# Patient Record
Sex: MALE | Race: WHITE | Employment: STUDENT | URBAN - METROPOLITAN AREA
[De-identification: names, ages, dates, MRNs, and addresses within clinical notes are randomized per-mention and may not be internally consistent; named-entity substitution may affect disease eponyms.]

---

## 2017-10-11 ENCOUNTER — HOSPITAL ENCOUNTER (EMERGENCY)
Age: 22
Discharge: HOME | End: 2017-10-11
Attending: EMERGENCY MEDICINE | Admitting: EMERGENCY MEDICINE
Payer: COMMERCIAL

## 2017-10-11 VITALS
RESPIRATION RATE: 14 BRPM | WEIGHT: 160 LBS | DIASTOLIC BLOOD PRESSURE: 79 MMHG | HEART RATE: 74 BPM | OXYGEN SATURATION: 97 % | BODY MASS INDEX: 22.9 KG/M2 | TEMPERATURE: 98.4 F | SYSTOLIC BLOOD PRESSURE: 132 MMHG | HEIGHT: 70 IN

## 2017-10-11 DIAGNOSIS — S61.211A LACERATION OF LEFT INDEX FINGER WITHOUT FOREIGN BODY WITHOUT DAMAGE TO NAIL, INITIAL ENCOUNTER: Primary | ICD-10-CM

## 2017-10-11 PROCEDURE — 99282 EMERGENCY DEPT VISIT SF MDM: CPT

## 2017-10-11 PROCEDURE — 77030002986 HC SUT PROL J&J -A

## 2017-10-11 PROCEDURE — 74011000250 HC RX REV CODE- 250: Performed by: NURSE PRACTITIONER

## 2017-10-11 PROCEDURE — 75810000293 HC SIMP/SUPERF WND  RPR

## 2017-10-11 PROCEDURE — 77030018836 HC SOL IRR NACL ICUM -A

## 2017-10-11 RX ORDER — BACITRACIN ZINC 500 [USP'U]/G
1 CREAM TOPICAL ONCE
Status: COMPLETED | OUTPATIENT
Start: 2017-10-11 | End: 2017-10-11

## 2017-10-11 RX ORDER — LIDOCAINE HYDROCHLORIDE 10 MG/ML
10 INJECTION INFILTRATION; PERINEURAL ONCE
Status: COMPLETED | OUTPATIENT
Start: 2017-10-11 | End: 2017-10-11

## 2017-10-11 RX ADMIN — BACITRACIN ZINC 1 PACKET: 500 OINTMENT TOPICAL at 13:06

## 2017-10-11 RX ADMIN — LIDOCAINE HYDROCHLORIDE 10 ML: 10 INJECTION, SOLUTION INFILTRATION; PERINEURAL at 13:06

## 2017-10-11 NOTE — ED PROVIDER NOTES
HPI Comments: Patient is a 64-year-old male who comes to the ED today via private vehicle with a laceration to his left index finger. Patient states it occurred approximately 10 minutes prior to arrival. Patient states he was opening a box with a copy of it when the knife slipped and cut the inside surface of his index finger. Patient denies any medications daily or allergies. Tetanus was last updated in 2013. In no further complaints at this time. Primary care provider:    Patient is a 23 y.o. male presenting with skin laceration. The history is provided by the patient. Laceration    The incident occurred less than 1 hour ago. The laceration is located on the left hand. The laceration is 2 cm in size. The injury mechanism is a dirty knife. Foreign body present: no. The pain is at a severity of 2/10. The pain is mild. The pain has been constant since onset. Associated symptoms include numbness. Pertinent negatives include no tingling, no weakness, no loss of motion, no coolness and no discoloration. The patient's last tetanus shot was less than 5 years ago. History reviewed. No pertinent past medical history. History reviewed. No pertinent surgical history. History reviewed. No pertinent family history. Social History     Social History    Marital status: SINGLE     Spouse name: N/A    Number of children: N/A    Years of education: N/A     Occupational History    Not on file. Social History Main Topics    Smoking status: Never Smoker    Smokeless tobacco: Never Used    Alcohol use No    Drug use: Not on file    Sexual activity: Not on file     Other Topics Concern    Not on file     Social History Narrative    No narrative on file         ALLERGIES: Review of patient's allergies indicates no known allergies. Review of Systems   Constitutional: Positive for activity change. Skin: Positive for wound.    Allergic/Immunologic: Negative for environmental allergies, food allergies and immunocompromised state. Neurological: Positive for numbness. Negative for tingling and weakness. All other systems reviewed and are negative. Vitals:    10/11/17 1250   BP: 132/79   Pulse: 74   Resp: 14   Temp: 98.4 °F (36.9 °C)   SpO2: 97%   Weight: 72.6 kg (160 lb)   Height: 5' 10\" (1.778 m)            Physical Exam   Constitutional: He appears well-developed and well-nourished. HENT:   Head: Normocephalic and atraumatic. Musculoskeletal: Normal range of motion. Left hand: He exhibits tenderness and laceration. He exhibits normal range of motion, no bony tenderness, normal capillary refill, no deformity and no swelling. Normal sensation noted. Normal strength noted. Hands:  Skin: Skin is warm. Nursing note and vitals reviewed. MDM  Number of Diagnoses or Management Options  Laceration of left index finger without foreign body without damage to nail, initial encounter:   Diagnosis management comments: Assessment & Plan:   Repair of left 4th finger laceration    Lidocaine 1%  Bacitracin  Local wound care    Discussed with MD Alessandra Serrano, VIC  10/11/17  1:18 PM          1:53 PM  The patient has been reevaluated. The patient is ready for discharge. The patient's signs, symptoms, diagnosis, and discharge instructions have been discussed and the patient/ family has conveyed their understanding. The patient is to follow up as recommended or return to the ED should their symptoms worsen. Plan has been discussed and the patient is in agreement. LABORATORY TESTS:  No results found for this or any previous visit (from the past 12 hour(s)). IMAGING RESULTS:  No orders to display  No results found.       MEDICATIONS GIVEN:  Medications  bacitracin zinc 500 unit/gram packet 1 Packet (1 Packet Topical Given by Provider 10/11/17 2224)  lidocaine (XYLOCAINE) 10 mg/mL (1 %) injection 10 mL (10 mL IntraDERMal Given by Provider 10/11/17 3461)    IMPRESSION:  Laceration of left index finger without foreign body without damage to nail, initial encounter  (primary encounter diagnosis)    PLAN:  1. Current Discharge Medication List      2. Follow-up Information     Follow up With Details Comments [address], [place]  For suture removal in 10-14 days [address] [facility] [address] [phone]      [facility] DEPT  As needed, If symptoms worsen [address]  [phone]      3. Discussed s/s wound infection    Return to ED for new or worsening symptoms       Amber Beth NP      ED Course       Wound Repair  Date/Time: 10/11/2017 1:15 PM  Performed by: NPPreparation: skin prepped with Betadine and skin prepped with ChloraPrep  Location details: left index finger  Wound length:2.5 cm or less  Anesthesia: digital block    Anesthesia:  Local Anesthetic: lidocaine 1% without epinephrine  Anesthetic total: 2 mL  Foreign bodies: no foreign bodies  Irrigation solution: saline  Irrigation method: syringe  Debridement: none  Skin closure: Prolene  Number of sutures: 5  Technique: interrupted  Approximation: close  Dressing: antibiotic ointment and 4x4  Patient tolerance: Patient tolerated the procedure well with no immediate complications  My total time at bedside, performing this procedure was 16-30 minutes.

## 2017-10-11 NOTE — ED TRIAGE NOTES
Pt presents with laceration to left index finger from a leung's knife. Incident happened approx 10 min PTA.

## 2017-10-11 NOTE — ED NOTES
The patient was discharged home by [facility], NP in stable condition. The patient is alert and oriented, in no respiratory distress. The patient's diagnosis, condition and treatment were explained. The patient expressed understanding. A discharge plan has been developed. A  was not involved in the process. Aftercare instructions were given. Pt ambulatory out of the ED.

## 2017-10-11 NOTE — DISCHARGE INSTRUCTIONS
